# Patient Record
(demographics unavailable — no encounter records)

---

## 2025-07-09 NOTE — HISTORY OF PRESENT ILLNESS
[FreeTextEntry1] : The patient is a 56-year-old male with a PMH of type 2 diabetes who presented to the office for an initial visit. The patient was referred by his primary care provider.  He was accompanied by his partner today.  He reported experiencing a 40-pound unintentional weight loss over the past year.  He also reported experiencing generalized weakness predominantly affecting his upper extremities.  His partner has also noticed a change in his gait.  The patient was evaluated by an oncologist and was apparently found to have an elevated CA 19-9; he underwent testing including a PET scan which was apparently unremarkable.  He also underwent a colonoscopy which was apparently unremarkable.  He was apparently found to have an abnormal blood test by his PCP but it was not available for review today. The patient denied having headaches, vision changes, photosensitivity, dry eyes, dry mouth, mouth sores, Raynaud's phenomenon, joint swelling, fevers, or night sweats.  Past Surgical History: None Family History: No known family history of autoimmune diseases. Social History: denied cigarette smoking, alcohol use, illicit substance use

## 2025-07-09 NOTE — PHYSICAL EXAM
[General Appearance - Alert] : alert [General Appearance - In No Acute Distress] : in no acute distress [Extraocular Movements] : extraocular movements were intact [FreeTextEntry1] : Fingers noted to be flexed with limited range of motion in the shoulders and knees

## 2025-07-09 NOTE — ASSESSMENT
[FreeTextEntry1] : A 56-year-old male with a PMH of type 2 diabetes who presented to the office for an initial visit. The patient was referred by his primary care provider.  He was accompanied by his partner today.  He reported experiencing a 40-pound unintentional weight loss over the past year.  He also reported experiencing generalized weakness predominantly affecting his upper extremities.  His partner has also noticed a change in his gait.  The patient was evaluated by an oncologist and was apparently found to have an elevated CA 19-9; he underwent testing including a PET scan which was apparently unremarkable.  He also underwent a colonoscopy which was apparently unremarkable.  He was apparently found to have an abnormal blood test by his PCP but it was not available for review today. The patient denied having headaches, vision changes, photosensitivity, dry eyes, dry mouth, mouth sores, Raynaud's phenomenon, joint swelling, fevers, or night sweats.  The etiology of the patient's symptoms is unclear at this time.  The differential diagnosis includes malignancy and/or paraneoplastic phenomenon considering his unintentional weight loss and generalized weakness developing over the past year.  The differential diagnosis also includes iron overload considering his elevated ferritin and systemic amyloidosis which can cause joint stiffness and limited range of motion with systemic symptoms.  The differential diagnosis also includes an idiopathic inflammatory myopathy which can be associated with malignancy.  The patient was advised to undergo further testing to help determine the most likely cause of his symptoms.  Plan: Labs were obtained in office today; I will call the patient with the results Follow-up with hematology Follow-up with neurology  Return to office TBD pending above work-up.